# Patient Record
Sex: MALE
[De-identification: names, ages, dates, MRNs, and addresses within clinical notes are randomized per-mention and may not be internally consistent; named-entity substitution may affect disease eponyms.]

---

## 2021-08-18 ENCOUNTER — HOSPITAL ENCOUNTER (INPATIENT)
Dept: HOSPITAL 60 - LB.ED | Age: 71
LOS: 3 days | Discharge: NURSING, REHAB OR LONG-TERM CARE | DRG: 564 | End: 2021-08-21
Attending: SURGERY | Admitting: SURGERY
Payer: MEDICARE

## 2021-08-18 DIAGNOSIS — F17.200: ICD-10-CM

## 2021-08-18 DIAGNOSIS — K74.60: ICD-10-CM

## 2021-08-18 DIAGNOSIS — Z20.822: ICD-10-CM

## 2021-08-18 DIAGNOSIS — J18.9: ICD-10-CM

## 2021-08-18 DIAGNOSIS — E86.0: ICD-10-CM

## 2021-08-18 DIAGNOSIS — K72.90: ICD-10-CM

## 2021-08-18 DIAGNOSIS — T40.601A: ICD-10-CM
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
## 2021-08-18 DIAGNOSIS — M25.551: ICD-10-CM

## 2021-08-18 DIAGNOSIS — K65.2: ICD-10-CM

## 2021-08-18 DIAGNOSIS — T79.6XXA: Primary | ICD-10-CM

## 2021-08-18 PROCEDURE — 36415 COLL VENOUS BLD VENIPUNCTURE: CPT

## 2021-08-18 PROCEDURE — 70450 CT HEAD/BRAIN W/O DYE: CPT

## 2021-08-18 PROCEDURE — 51702 INSERT TEMP BLADDER CATH: CPT

## 2021-08-18 PROCEDURE — 71045 X-RAY EXAM CHEST 1 VIEW: CPT

## 2021-08-18 PROCEDURE — 93005 ELECTROCARDIOGRAM TRACING: CPT

## 2021-08-18 PROCEDURE — 73502 X-RAY EXAM HIP UNI 2-3 VIEWS: CPT

## 2021-08-18 PROCEDURE — 84484 ASSAY OF TROPONIN QUANT: CPT

## 2021-08-18 PROCEDURE — 80053 COMPREHEN METABOLIC PANEL: CPT

## 2021-08-18 PROCEDURE — 82550 ASSAY OF CK (CPK): CPT

## 2021-08-18 PROCEDURE — U0002 COVID-19 LAB TEST NON-CDC: HCPCS

## 2021-08-18 PROCEDURE — G0378 HOSPITAL OBSERVATION PER HR: HCPCS

## 2021-08-18 PROCEDURE — A0429 BLS-EMERGENCY: HCPCS

## 2021-08-18 PROCEDURE — A0425 GROUND MILEAGE: HCPCS

## 2021-08-18 PROCEDURE — 82140 ASSAY OF AMMONIA: CPT

## 2021-08-18 PROCEDURE — 81001 URINALYSIS AUTO W/SCOPE: CPT

## 2021-08-18 PROCEDURE — 74177 CT ABD & PELVIS W/CONTRAST: CPT

## 2021-08-18 PROCEDURE — 80048 BASIC METABOLIC PNL TOTAL CA: CPT

## 2021-08-18 PROCEDURE — 99285 EMERGENCY DEPT VISIT HI MDM: CPT

## 2021-08-18 PROCEDURE — 71260 CT THORAX DX C+: CPT

## 2021-08-18 PROCEDURE — 87040 BLOOD CULTURE FOR BACTERIA: CPT

## 2021-08-18 PROCEDURE — 85027 COMPLETE CBC AUTOMATED: CPT

## 2021-08-18 PROCEDURE — 73700 CT LOWER EXTREMITY W/O DYE: CPT

## 2021-08-18 NOTE — EDM.PDOC
ED HPI GENERAL MEDICAL PROBLEM





- General


Chief Complaint: Lower Extremity Injury/Pain


Stated Complaint: fall


Time Seen by Provider: 08/18/21 19:51


Source of Information: Reports: Patient


History Limitations: Reports: No Limitations





- History of Present Illness


INITIAL COMMENTS - FREE TEXT/NARRATIVE: 





patient was brought to the ER by EMS - after he was found on the floor by his s

on.





EMS reports that he was alert and oriented, but sleepy, c/o pain every where. a 

Near empty bottle of oxycodone was next to him as well - the bottle label says 

90# tablets - dispensed 5 days ago - to [facility] 1 tab q8hrs - seems that majority 

was missing except 7-8 tablets.





His son is providing a h/o - reports that his dad has chronic pain issues - 

starting with a h/o liver resection due to liver cancer several years ago.





Also was recently d/cd from OSH after he was admitted for viral Gi infection. 





No reports of fever or chills. 





h/o ESLD with mild ascites. 











- Related Data


                                    Allergies











Allergy/AdvReac Type Severity Reaction Status Date / Time


 


No Known Allergies Allergy   Verified 08/18/21 21:43














Review of Systems





- Review of Systems


Review Of Systems: See Below


Eyes: Reports: No Symptoms


Mouth/Throat: Reports: No Symptoms


Respiratory: Reports: No Symptoms


Cardiovascular: Reports: No Symptoms


GI/Abdominal: Reports: Abdominal Pain


Musculoskeletal: Reports: No Symptoms


Skin: Reports: Bruising, Pruritis





ED EXAM, GENERAL





- Physical Exam


Exam: See Below


Exam Limited By: No Limitations


General Appearance: Alert, WD/WN, No Apparent Distress


Eye Exam: Bilateral Eye: EOMI, PERRL


Head: Atraumatic


Respiratory/Chest: No Respiratory Distress


Cardiovascular: Normal Peripheral Pulses, Regular Rate, Rhythm


GI/Abdominal: Soft, Tender (chronic abdominal pain), Other (mildly distended )


Extremities: Other (there is a skin abrasion loss to the RUE)





Course





- Orders/Labs/Meds


Orders: 


                               Active Orders 24 hr











 Category Date Time Status


 


 Head wo Cont [CT] Stat Exams  08/18/21 21:34 Ordered


 


 Hip Min 2V w Pelvis Bi [CR] Stat Exams  08/18/21 21:34 Ordered


 


 Hip wo Cont Rt [CT] Stat Exams  08/18/21 21:42 Taken


 


 Sodium Chloride 0.9% [Normal Saline] 1,000 ml Med  08/18/21 21:45 Active





 IV ASDIRECTED   


 


 Sodium Chloride 0.9% [Saline Flush] Med  08/18/21 21:34 Active





 10 ml FLUSH ASDIRECTED PRN   


 


 Saline Lock Insert [OM.PC] Routine Oth  08/18/21 21:34 Ordered








                                Medication Orders





Sodium Chloride (Normal Saline)  1,000 mls @ 999 mls/hr IV ASDIRECTED SHIVA


Sodium Chloride (Sodium Chloride 0.9% 10 Ml Syringe)  10 ml FLUSH ASDIRECTED PRN


   PRN Reason: Keep Vein Open








Labs: 


                                Laboratory Tests











  08/18/21 08/18/21 08/18/21 Range/Units





  20:30 21:00 21:00 


 


WBC   14.9 H   (4.0-11.0)  K/uL


 


RBC   5.39   (4.50-6.50)  M/uL


 


Hgb   14.1   (13.0-18.0)  g/dL


 


Hct   42.3   (40.0-54.0)  %


 


MCV   79   (76-96)  fL


 


MCH   26.2 L   (27.0-32.0)  pg


 


MCHC   33.3   (31.0-35.0)  g/dL


 


RDW   17.3 H   (11.0-16.0)  %


 


Plt Count   112 L   (150-400)  K/uL


 


MPV   10.4 H   (6.0-10.0)  fL


 


Sodium    132 L  (136-145)  mmol/L


 


Potassium    4.6  (3.5-5.1)  mmol/L


 


Chloride    100  ()  mmol/L


 


Carbon Dioxide    27.9  (21.0-32.0)  mmol/L


 


Anion Gap    8.7  (5.0-15.0)  mmol/L


 


BUN    11  (8-26)  mg/dL


 


Creatinine    0.90  (0.70-1.30)  mg/dL


 


Est Cr Clr Drug Dosing    TNP  


 


Estimated GFR (MDRD)    > 60  (>60)  MLS/MIN


 


BUN/Creatinine Ratio    12.2  (6-25)  


 


Glucose    156 H  ()  mg/dL


 


Calcium    8.7  (8.5-10.1)  mg/dL


 


Total Bilirubin    1.5 H  (0.0-1.0)  mg/dL


 


AST    147 H  (15-37)  U/L


 


ALT    54  (12-78)  U/L


 


Alkaline Phosphatase    117 H  ()  U/L


 


Ammonia  44 H    (11-32)  umol/L


 


Creatine Kinase    3815 H  ()  U/L


 


Troponin I    < 0.017  (0.000-0.060)  ng/mL


 


Total Protein    7.4  (6.4-8.2)  g/dL


 


Albumin    2.8 L  (3.4-5.0)  g/dL


 


Globulin    4.6 H  (2.2-4.2)  g/dL


 


Albumin/Globulin Ratio    0.6 L  (0.8-2.0)  











Meds: 


Medications











Generic Name Dose Route Start Last Admin





  Trade Name Freq  PRN Reason Stop Dose Admin


 


Sodium Chloride  1,000 mls @ 999 mls/hr  08/18/21 21:45 





  Normal Saline  IV  





  ASDIRECTED SHIVA  


 


Sodium Chloride  10 ml  08/18/21 21:34 





  Sodium Chloride 0.9% 10 Ml Syringe  FLUSH  





  ASDIRECTED PRN  





  Keep Vein Open  














- Re-Assessments/Exams


Free Text/Narrative Re-Assessment/Exam: 





CT head - WNL





CT hip - no e/o fracture





IVF was started





labs - mild leukocytosis, and mild chronic elevation of liver enzymes





CK is elevated. as well as ammonia 





wound care with bacitracin and sterile dressing





patient probably overdoses on oxycodone and fell down due to that.








will admit to the floor for IVF, monitoring kidney function and CK levels. 








Departure





- Departure


Time of Disposition: 22:52


Disposition: Refer to Observation


Condition: Fair


Clinical Impression: 


 Right hip pain, Chronic liver disease and cirrhosis





Fall


Qualifiers:


 Encounter type: initial encounter Qualified Code(s): W19.XXXA - Unspecified 

fall, initial encounter





Traumatic rhabdomyolysis


Qualifiers:


 Encounter type: initial encounter Qualified Code(s): T79.6XXA - Traumatic 

ischemia of muscle, initial encounter





Narcotic overdose


Qualifiers:


 Encounter type: initial encounter Injury intent: accidental or unintentional 

Qualified Code(s): T40.601A - Poisoning by unspecified narcotics, accidental 

(unintentional), initial encounter








- Discharge Information


*PRESCRIPTION DRUG MONITORING PROGRAM REVIEWED*: Not Applicable


*COPY OF PRESCRIPTION DRUG MONITORING REPORT IN PATIENT BRIJESH: Not Applicable


Forms:  ED Department Discharge





- Problem List & Annotations


(1) Chronic liver disease and cirrhosis


SNOMED Code(s): 181509102


   Code(s): K74.60 - UNSPECIFIED CIRRHOSIS OF LIVER; K76.9 - LIVER DISEASE, 

UNSPECIFIED   Status: Chronic   Priority: Low   





(2) Fall


SNOMED Code(s): 1576053, 488680263


   Code(s): W19.XXXA - UNSPECIFIED FALL, INITIAL ENCOUNTER   Status: Acute   

Priority: Low   


Qualifiers: 


   Encounter type: initial encounter   Qualified Code(s): W19.XXXA - Unspecified

 fall, initial encounter   





(3) Narcotic overdose


SNOMED Code(s): 992619275


   Code(s): T40.601A - POISONING BY UNSP NARCOTICS, ACCIDENTAL, INIT   Status: 

Acute   Priority: Low   


Qualifiers: 


   Encounter type: initial encounter   Injury intent: accidental or 

unintentional   Qualified Code(s): T40.601A - Poisoning by unspecified 

narcotics, accidental (unintentional), initial encounter   





(4) Right hip pain


SNOMED Code(s): 04427839


   Code(s): M25.551 - PAIN IN RIGHT HIP   Status: Acute   Priority: Low   





(5) Traumatic rhabdomyolysis


SNOMED Code(s): 329651656


   Code(s): T79.6XXA - TRAUMATIC ISCHEMIA OF MUSCLE, INITIAL ENCOUNTER   Status:

 Acute   Priority: Medium   


Qualifiers: 


   Encounter type: initial encounter   Qualified Code(s): T79.6XXA - Traumatic 

ischemia of muscle, initial encounter   





- Problem List Review


Problem List Initiated/Reviewed/Updated: Yes





- My Orders


Last 24 Hours: 


My Active Orders





08/18/21 21:34


Head wo Cont [CT] Stat 


Hip Min 2V w Pelvis Bi [CR] Stat 


Sodium Chloride 0.9% [Saline Flush]   10 ml FLUSH ASDIRECTED PRN 


Saline Lock Insert [OM.PC] Routine 





08/18/21 21:42


Hip wo Cont Rt [CT] Stat 





08/18/21 21:45


Sodium Chloride 0.9% [Normal Saline] 1,000 ml IV ASDIRECTED 














- Assessment/Plan


Last 24 Hours: 


My Active Orders





08/18/21 21:34


Head wo Cont [CT] Stat 


Hip Min 2V w Pelvis Bi [CR] Stat 


Sodium Chloride 0.9% [Saline Flush]   10 ml FLUSH ASDIRECTED PRN 


Saline Lock Insert [OM.PC] Routine 





08/18/21 21:42


Hip wo Cont Rt [CT] Stat 





08/18/21 21:45


Sodium Chloride 0.9% [Normal Saline] 1,000 ml IV ASDIRECTED 











Plan: 





1- Rhabdomyolysis:


IVF - gently hydration


Repeat CK tomorrow 








2- Narcotics overdose:


avoid narcotics at the moment


Pain control with non-opioids 








3- ESLD:


2/2 liver cancer in the past











4- right hip pain: 


2/2 fall


no fracture on CT scan 











5- Dehydration:


IVF as needed











6- LUE wound:


bacitracin and sterile dressing

## 2021-08-19 RX ADMIN — KETOROLAC TROMETHAMINE SCH MG: 60 INJECTION, SOLUTION INTRAMUSCULAR at 05:02

## 2021-08-19 RX ADMIN — KETOROLAC TROMETHAMINE SCH: 60 INJECTION, SOLUTION INTRAMUSCULAR at 07:42

## 2021-08-19 RX ADMIN — KETOROLAC TROMETHAMINE SCH MG: 60 INJECTION, SOLUTION INTRAMUSCULAR at 10:16

## 2021-08-19 RX ADMIN — KETOROLAC TROMETHAMINE SCH MG: 60 INJECTION, SOLUTION INTRAMUSCULAR at 17:00

## 2021-08-19 RX ADMIN — KETOROLAC TROMETHAMINE SCH MG: 60 INJECTION, SOLUTION INTRAMUSCULAR at 23:08

## 2021-08-19 NOTE — PCM.HP.2
H&P History of Present Illness





- General


Date of Service: 08/19/21


Admit Problem/Dx: 


                           Admission Diagnosis/Problem





Admission Diagnosis/Problem      Rhabdomyolysis








Source of Information: Patient


History Limitations: Reports: No Limitations





- History of Present Illness


Initial Comments - Free Text/Narative: 





patient with a h/o ESLD who was admitted to the hospital for management of r

habdomyolysis.





He was found on the ground yesterday by his son, after he fell few hours 

earlier.





Patient has a h/o chronic pains - on oxycodone. There is a possibility that 

patient also has took extra doses given that his bottle was near empty.





CR was elevated with normal Cr.





IVF was started.





CT head WNL





c/o right hip pain - CT hip was obtained - this was negative as well.





ZENOBIA has a wound - this was cleaned up and dressed with sterile gauze.   





- Related Data


Allergies/Adverse Reactions: 


                                    Allergies











Allergy/AdvReac Type Severity Reaction Status Date / Time


 


No Known Allergies Allergy   Verified 08/18/21 21:43














Past Medical History





- Infectious Disease History


Infectious Disease History: Reports: Chicken Pox





Social & Family History





- Tobacco Use


Tobacco Use Status *Q: Light Tobacco User


Years of Tobacco use: 50


Packs/Tins Daily: 0.2





H&P Review of Systems





- Review of Systems:


Review Of Systems: See Below


General: Reports: Malaise, Fatigue


HEENT: Reports: No Symptoms


Pulmonary: Reports: No Symptoms


Cardiovascular: Reports: No Symptoms


Gastrointestinal: Reports: Abdominal Pain (chronic )


Genitourinary: Reports: No Symptoms


Musculoskeletal: Reports: Joint Pain, Muscle Pain


Skin: Reports: Bruising


Psychiatric: Reports: No Symptoms


Neurological: Reports: No Symptoms





Exam





- Exam


Exam: See Below





- Vital Signs


Vital Signs: 


                                Last Vital Signs











Temp  37.1 C   08/19/21 12:00


 


Pulse  85   08/19/21 12:00


 


Resp  18   08/19/21 08:00


 


BP  114/76   08/19/21 12:00


 


Pulse Ox  94 L  08/19/21 12:00














- Exam


General: Alert, Oriented


HEENT: PERRLA


Cardiovascular: Regular Rate, Regular Rhythm


GI/Abdominal Exam: Normal Bowel Sounds, Soft, Tender (mild TTP throughout )


Back Exam: Normal Inspection


Extremities: Normal Inspection, Normal Range of Motion


Neuro Extensive - Mental Status: Alert, Oriented x3, Normal Mood/Affect, Other 

(sleepy)





- Patient Data


Lab Results Last 24 hrs: 


                         Laboratory Results - last 24 hr











  08/18/21 08/18/21 08/18/21 Range/Units





  20:30 21:00 21:00 


 


WBC   14.9 H   (4.0-11.0)  K/uL


 


RBC   5.39   (4.50-6.50)  M/uL


 


Hgb   14.1   (13.0-18.0)  g/dL


 


Hct   42.3   (40.0-54.0)  %


 


MCV   79   (76-96)  fL


 


MCH   26.2 L   (27.0-32.0)  pg


 


MCHC   33.3   (31.0-35.0)  g/dL


 


RDW   17.3 H   (11.0-16.0)  %


 


Plt Count   112 L   (150-400)  K/uL


 


MPV   10.4 H   (6.0-10.0)  fL


 


Sodium    132 L  (136-145)  mmol/L


 


Potassium    4.6  (3.5-5.1)  mmol/L


 


Chloride    100  ()  mmol/L


 


Carbon Dioxide    27.9  (21.0-32.0)  mmol/L


 


Anion Gap    8.7  (5.0-15.0)  mmol/L


 


BUN    11  (8-26)  mg/dL


 


Creatinine    0.90  (0.70-1.30)  mg/dL


 


Est Cr Clr Drug Dosing    TNP  


 


Estimated GFR (MDRD)    > 60  (>60)  MLS/MIN


 


BUN/Creatinine Ratio    12.2  (6-25)  


 


Glucose    156 H  ()  mg/dL


 


Calcium    8.7  (8.5-10.1)  mg/dL


 


Total Bilirubin    1.5 H  (0.0-1.0)  mg/dL


 


AST    147 H  (15-37)  U/L


 


ALT    54  (12-78)  U/L


 


Alkaline Phosphatase    117 H  ()  U/L


 


Ammonia  44 H    (11-32)  umol/L


 


Creatine Kinase    3815 H  ()  U/L


 


Troponin I    < 0.017  (0.000-0.060)  ng/mL


 


Total Protein    7.4  (6.4-8.2)  g/dL


 


Albumin    2.8 L  (3.4-5.0)  g/dL


 


Globulin    4.6 H  (2.2-4.2)  g/dL


 


Albumin/Globulin Ratio    0.6 L  (0.8-2.0)  


 


SARS-CoV-2 RNA ([facility]OMA)     (NEGATIVE)  














  08/19/21 08/19/21 08/19/21 Range/Units





  09:02 10:43 10:43 


 


WBC   13.0 H   (4.0-11.0)  K/uL


 


RBC   5.02   (4.50-6.50)  M/uL


 


Hgb   13.1   (13.0-18.0)  g/dL


 


Hct   39.3 L   (40.0-54.0)  %


 


MCV   78   (76-96)  fL


 


MCH   26.1 L   (27.0-32.0)  pg


 


MCHC   33.3   (31.0-35.0)  g/dL


 


RDW   17.3 H   (11.0-16.0)  %


 


Plt Count   94 L   (150-400)  K/uL


 


MPV   10.3 H   (6.0-10.0)  fL


 


Sodium    134 L  (136-145)  mmol/L


 


Potassium    4.4  (3.5-5.1)  mmol/L


 


Chloride    103  ()  mmol/L


 


Carbon Dioxide    29.1  (21.0-32.0)  mmol/L


 


Anion Gap    6.3  (5.0-15.0)  mmol/L


 


BUN    9  (8-26)  mg/dL


 


Creatinine    0.77  (0.70-1.30)  mg/dL


 


Est Cr Clr Drug Dosing    TNP  


 


Estimated GFR (MDRD)    > 60  (>60)  MLS/MIN


 


BUN/Creatinine Ratio    11.7  (6-25)  


 


Glucose    121 H  ()  mg/dL


 


Calcium    8.3 L  (8.5-10.1)  mg/dL


 


Total Bilirubin     (0.0-1.0)  mg/dL


 


AST     (15-37)  U/L


 


ALT     (12-78)  U/L


 


Alkaline Phosphatase     ()  U/L


 


Ammonia     (11-32)  umol/L


 


Creatine Kinase     ()  U/L


 


Troponin I     (0.000-0.060)  ng/mL


 


Total Protein     (6.4-8.2)  g/dL


 


Albumin     (3.4-5.0)  g/dL


 


Globulin     (2.2-4.2)  g/dL


 


Albumin/Globulin Ratio     (0.8-2.0)  


 


SARS-CoV-2 RNA (CHIOMA)  Negative    (NEGATIVE)  














  08/19/21 08/19/21 Range/Units





  10:43 10:43 


 


WBC    (4.0-11.0)  K/uL


 


RBC    (4.50-6.50)  M/uL


 


Hgb    (13.0-18.0)  g/dL


 


Hct    (40.0-54.0)  %


 


MCV    (76-96)  fL


 


MCH    (27.0-32.0)  pg


 


MCHC    (31.0-35.0)  g/dL


 


RDW    (11.0-16.0)  %


 


Plt Count    (150-400)  K/uL


 


MPV    (6.0-10.0)  fL


 


Sodium    (136-145)  mmol/L


 


Potassium    (3.5-5.1)  mmol/L


 


Chloride    ()  mmol/L


 


Carbon Dioxide    (21.0-32.0)  mmol/L


 


Anion Gap    (5.0-15.0)  mmol/L


 


BUN    (8-26)  mg/dL


 


Creatinine    (0.70-1.30)  mg/dL


 


Est Cr Clr Drug Dosing    


 


Estimated GFR (MDRD)    (>60)  MLS/MIN


 


BUN/Creatinine Ratio    (6-25)  


 


Glucose    ()  mg/dL


 


Calcium    (8.5-10.1)  mg/dL


 


Total Bilirubin    (0.0-1.0)  mg/dL


 


AST    (15-37)  U/L


 


ALT    (12-78)  U/L


 


Alkaline Phosphatase    ()  U/L


 


Ammonia  29   (11-32)  umol/L


 


Creatine Kinase   1596 H  ()  U/L


 


Troponin I    (0.000-0.060)  ng/mL


 


Total Protein    (6.4-8.2)  g/dL


 


Albumin    (3.4-5.0)  g/dL


 


Globulin    (2.2-4.2)  g/dL


 


Albumin/Globulin Ratio    (0.8-2.0)  


 


SARS-CoV-2 RNA (CHIOMA)    (NEGATIVE)  











Result Diagrams: 


                                 08/19/21 10:43





                                 08/19/21 10:43





Sepsis Event Note





- Evaluation


Sepsis Screening Result: No Definite Risk





- Focused Exam


Vital Signs: 


                                   Vital Signs











  Temp Temp Pulse Resp BP Pulse Ox


 


 08/19/21 12:00   37.1 C  85   114/76  94 L


 


 08/19/21 08:00  36.8 C  36.8 C  88  18  119/78  96


 


 08/19/21 06:00  36.6 C   90  20  124/76  94 L














- Problem List


(1) Chronic liver disease and cirrhosis


SNOMED Code(s): 309798853


   ICD Code: K74.60 - UNSPECIFIED CIRRHOSIS OF LIVER; K76.9 - LIVER DISEASE, 

UNSPECIFIED   Status: Chronic   Priority: Low   Current Visit: No   





(2) Fall


SNOMED Code(s): 3463594, 785281381


   ICD Code: W19.XXXA - UNSPECIFIED FALL, INITIAL ENCOUNTER   Status: Acute   

Priority: Low   Current Visit: No   


Qualifiers: 


   Encounter type: initial encounter   Qualified Code(s): W19.XXXA - Unspecified

fall, initial encounter   





(3) Narcotic overdose


SNOMED Code(s): 788498568


   ICD Code: T40.601A - POISONING BY UNSP NARCOTICS, ACCIDENTAL, INIT   Status: 

Acute   Priority: Low   Current Visit: No   


Qualifiers: 


   Encounter type: initial encounter   Injury intent: accidental or unintent

ional   Qualified Code(s): T40.601A - Poisoning by unspecified narcotics, a

ccidental (unintentional), initial encounter   





(4) Right hip pain


SNOMED Code(s): 77869067


   ICD Code: M25.551 - PAIN IN RIGHT HIP   Status: Acute   Priority: Low   

Current Visit: No   





(5) Traumatic rhabdomyolysis


SNOMED Code(s): 723809700


   ICD Code: T79.6XXA - TRAUMATIC ISCHEMIA OF MUSCLE, INITIAL ENCOUNTER   

Status: Acute   Priority: Medium   Current Visit: No   


Qualifiers: 


   Encounter type: initial encounter   Qualified Code(s): T79.6XXA - Traumatic 

ischemia of muscle, initial encounter   


Problem List Initiated/Reviewed/Updated: Yes


Orders Last 24hrs: 


                               Active Orders 24 hr











 Category Date Time Status


 


 Patient Status [ADT] Routine ADT  08/18/21 22:57 Active


 


 Oxygen Therapy [RC] PRN Care  08/18/21 22:57 Active


 


 Vital Signs [RC] Q4H Care  08/18/21 22:57 Active


 


 Regular Diet [DIET] Diet  08/19/21 Breakfast Ordered


 


 CULTURE MRSA SURVEY [RM] Routine Lab  08/19/21 08:05 Received


 


 Ketorolac [Toradol] Med  08/18/21 23:00 Active





 15 mg IVPUSH Q6H   


 


 Sodium Chloride 0.9% [Normal Saline] 1,000 ml Med  08/18/21 21:45 Active





 IV ASDIRECTED   


 


 Sodium Chloride 0.9% [Normal Saline] 1,000 ml Med  08/19/21 08:30 Active





 IV ASDIRECTED   


 


 Sodium Chloride 0.9% [Saline Flush] Med  08/18/21 21:34 Active





 10 ml FLUSH ASDIRECTED PRN   


 


 Saline Lock Insert [OM.PC] Routine Oth  08/18/21 21:34 Ordered








                                Medication Orders





Sodium Chloride (Normal Saline)  1,000 mls @ 999 mls/hr IV ASDIRECTED SHIVA


   Last Infusion: 08/18/21 23:20  Dose: 0 mls/hr


   Documented by: RO


   Admin: 08/18/21 21:35  Dose: 999 mls/hr


   Documented by: RO


Sodium Chloride (Normal Saline)  1,000 mls @ 150 mls/hr IV ASDIRECTED SHIVA


   Last Admin: 08/19/21 08:37  Dose: 150 mls/hr


   Documented by: MATT


Ketorolac Tromethamine (Ketorolac 60 Mg/2 Ml Sdv)  15 mg IVPUSH Q6H [facility]


   Stop: 08/23/21 22:59


   Last Admin: 08/19/21 10:16  Dose: 15 mg


   Documented by: MATT


   Admin: 08/19/21 07:42 Dose:  Not Given


   Documented by: MATT


   Admin: 08/19/21 05:02  Dose: 15 mg


   Documented by: RO


Sodium Chloride (Sodium Chloride 0.9% 10 Ml Syringe)  10 ml FLUSH ASDIRECTED PRN


   PRN Reason: Keep Vein Open








Assessment/Plan Comment:: 





1- Rhabdomyolysis:   Improving 


IVF - gently hydration


Repeat CK daily 








2- Narcotics overdose:


avoid narcotics at the moment


Pain control with non-opioids 








3- ESLD:


2/2 liver cancer in the past











4- right hip pain:   Improving 


2/2 fall


no fracture on CT scan 











5- Dehydration:    Improving 


IVF @100ml/hr


watch for fluids overload 








6- LUE wound:


bacitracin and sterile dressing 








- Mortality Measure


Prognosis:: Good

## 2021-08-19 NOTE — CT
Date of Service:  08/18/21

Clinical Data:  fall/injury



RIGHT HIP CT:



Multislice acquisition through the right hip was performed.  Axial images and 
sagittal and coronal reformations are reviewed.  



There is diffuse osteopenia.  There are moderate osteoarthritic changes 
involving the right hip joint.  There are mild degenerative changes involving 
the right SI joint.  



No acute fracture or dislocation.  No focal lytic or blastic bone lesions.  



There is a large amount of free fluid within the peritoneal cavity consistent 
with ascites.  



[id number]

[facility]

## 2021-08-19 NOTE — CR
Date of Service:  08/18/21

Clinical Data:  fall



PELVIS AND RIGHT HIP:  



No priors.  



There is diffuse osteopenia.  



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  There are mild 
degenerative changes involving the SI joints.  



No acute abnormalities.  No lytic or blastic bone lesions.  



[id number]





      Dictated by:  Sergio Sanders MD      

          <Electronically signed by Sergio Sanders MD> 08/19/21 at 1039      
      



PN

D:   08/19/21, 0822

T:   08/19/21, 0837

Doc Number:  [id number]

Copies To:  Theodore Kinney MD; PCP,None~

MTDD

## 2021-08-19 NOTE — CT
Date of Service:  08/18/21

Clinical Data:  fall head injury



UNENHANCED BRAIN CT:  



Multislice acquisition through the brain without IV contrast was performed.  



No priors.  



No masses or mass effect.  No intracranial hemorrhage.  No evidence of acute or 
subacute infarct.  



No osseous abnormalities.  



IMPRESSION:  No acute intracranial abnormalities.  



[id number]





      Dictated by:  Sergio Sanders MD      

          <Electronically signed by Sergio Sanders MD> 08/19/21 at 1039      
      



PN

D:   08/19/21, 0822

T:   08/19/21, 0834

Doc Number:  [id number]

Copies To:  Theodore Kinney MD; PCP,None~

MTDD

## 2021-08-20 RX ADMIN — KETOROLAC TROMETHAMINE SCH MG: 60 INJECTION, SOLUTION INTRAMUSCULAR at 12:35

## 2021-08-20 RX ADMIN — KETOROLAC TROMETHAMINE SCH MG: 60 INJECTION, SOLUTION INTRAMUSCULAR at 05:07

## 2021-08-20 NOTE — PCM.PN
- General Info


Date of Service: 08/20/21


Admission Dx/Problem (Free Text): 


                           Admission Diagnosis/Problem





Admission Diagnosis/Problem      Rhabdomyolysis








Functional Status: Reports: Other (fatigued/ sleepy)





- Patient Data


Vitals - Most Recent: 


                                Last Vital Signs











Temp  37.0 C   08/20/21 10:00


 


Pulse  94   08/20/21 10:00


 


Resp  18   08/20/21 10:00


 


BP  95/64   08/20/21 10:00


 


Pulse Ox  93 L  08/20/21 10:00











Weight - Most Recent: 77.02 kg


I&O - Last 24 Hours: 


                                 Intake & Output











 08/19/21 08/20/21 08/20/21





 22:59 06:59 14:59


 


Intake Total 1444 1370 


 


Balance 1444 1370 











Lab Results Last 24 Hours: 


                         Laboratory Results - last 24 hr











  08/20/21 08/20/21 08/20/21 Range/Units





  04:45 05:00 05:00 


 


WBC   13.4 H   (4.0-11.0)  K/uL


 


RBC   4.91   (4.50-6.50)  M/uL


 


Hgb   12.9 L   (13.0-18.0)  g/dL


 


Hct   39.0 L   (40.0-54.0)  %


 


MCV   79   (76-96)  fL


 


MCH   26.3 L   (27.0-32.0)  pg


 


MCHC   33.1   (31.0-35.0)  g/dL


 


RDW   17.4 H   (11.0-16.0)  %


 


Plt Count   103 L   (150-400)  K/uL


 


MPV   11.3 H   (6.0-10.0)  fL


 


Sodium    135 L  (136-145)  mmol/L


 


Potassium    4.3  (3.5-5.1)  mmol/L


 


Chloride    105  ()  mmol/L


 


Carbon Dioxide    25.9  (21.0-32.0)  mmol/L


 


Anion Gap    8.4  (5.0-15.0)  mmol/L


 


BUN    14  D  (8-26)  mg/dL


 


Creatinine    0.90  (0.70-1.30)  mg/dL


 


Est Cr Clr Drug Dosing    TNP  


 


Estimated GFR (MDRD)    > 60  (>60)  MLS/MIN


 


BUN/Creatinine Ratio    15.6  (6-25)  


 


Glucose    116 H  ()  mg/dL


 


Calcium    8.2 L  (8.5-10.1)  mg/dL


 


Creatine Kinase     ()  U/L


 


Troponin I     (0.000-0.060)  ng/mL


 


Urine Color  Yellow    


 


Urine Appearance  Clear    (CLEAR)  


 


Urine pH  5.5    (5.0-8.0)  


 


Ur Specific Gravity  1.025    (1.003-1.030)  


 


Urine Protein  Negative    (NEGATIVE)  mg/dL


 


Urine Glucose (UA)  Negative    (NEGATIVE)  mg/dL


 


Urine Ketones  40 H    (NEGATIVE)  mg/dL


 


Urine Occult Blood  Trace-lysed H    (NEGATIVE)  


 


Urine Nitrite  Negative    (NEGATIVE)  


 


Urine Bilirubin  Small H    (NEGATIVE)  


 


Urine Urobilinogen  1.0    (0.2-1.0)  E.U./dL


 


Ur Leukocyte Esterase  Negative    (NEGATIVE)  


 


U Hyaline Cast (Auto)  Few    /HPF


 


Urine RBC  0-5 H    /HPF


 


Urine WBC  0-5 H    /HPF


 


Ur Squamous Epith Cells  Few    /HPF


 


Calcium Oxalate Crystal  Few    /HPF


 


Fine Granular Casts  Few H    /HPF














  08/20/21 08/20/21 Range/Units





  05:00 05:00 


 


WBC    (4.0-11.0)  K/uL


 


RBC    (4.50-6.50)  M/uL


 


Hgb    (13.0-18.0)  g/dL


 


Hct    (40.0-54.0)  %


 


MCV    (76-96)  fL


 


MCH    (27.0-32.0)  pg


 


MCHC    (31.0-35.0)  g/dL


 


RDW    (11.0-16.0)  %


 


Plt Count    (150-400)  K/uL


 


MPV    (6.0-10.0)  fL


 


Sodium    (136-145)  mmol/L


 


Potassium    (3.5-5.1)  mmol/L


 


Chloride    ()  mmol/L


 


Carbon Dioxide    (21.0-32.0)  mmol/L


 


Anion Gap    (5.0-15.0)  mmol/L


 


BUN    (8-26)  mg/dL


 


Creatinine    (0.70-1.30)  mg/dL


 


Est Cr Clr Drug Dosing    


 


Estimated GFR (MDRD)    (>60)  MLS/MIN


 


BUN/Creatinine Ratio    (6-25)  


 


Glucose    ()  mg/dL


 


Calcium    (8.5-10.1)  mg/dL


 


Creatine Kinase  1698 H   ()  U/L


 


Troponin I   < 0.017  (0.000-0.060)  ng/mL


 


Urine Color    


 


Urine Appearance    (CLEAR)  


 


Urine pH    (5.0-8.0)  


 


Ur Specific Gravity    (1.003-1.030)  


 


Urine Protein    (NEGATIVE)  mg/dL


 


Urine Glucose (UA)    (NEGATIVE)  mg/dL


 


Urine Ketones    (NEGATIVE)  mg/dL


 


Urine Occult Blood    (NEGATIVE)  


 


Urine Nitrite    (NEGATIVE)  


 


Urine Bilirubin    (NEGATIVE)  


 


Urine Urobilinogen    (0.2-1.0)  E.U./dL


 


Ur Leukocyte Esterase    (NEGATIVE)  


 


U Hyaline Cast (Auto)    /HPF


 


Urine RBC    /HPF


 


Urine WBC    /HPF


 


Ur Squamous Epith Cells    /HPF


 


Calcium Oxalate Crystal    /HPF


 


Fine Granular Casts    /HPF











Med Orders - Current: 


                               Current Medications





Sodium Chloride (Normal Saline)  1,000 mls @ 100 mls/hr IV ASDIRECTED [facility]


   Last Admin: 08/19/21 23:14 Dose:  100 mls/hr


   Documented by: 


Piperacillin Sod/Tazobactam (Sod 3.375 gm/ Sodium Chloride)  50 mls @ 100 mls/hr

IV Q6H [facility]


   Last Admin: 08/20/21 05:15 Dose:  100 mls/hr


   Documented by: 


Vancomycin HCl 1 gm/ Sodium (Chloride)  250 mls @ 167 mls/hr IV Q12H [facility]


Sodium Chloride (Normal Saline)  1,000 mls @ 999 mls/hr IV ASDIRECTED [facility]


Iopamidol (Iopamidol 612 Mg/Ml 100 Ml Bottle)  100 ml IV ASDIRECTED [facility]


   Last Admin: 08/20/21 10:21 Dose:  100 ml


   Documented by: 


Ketorolac Tromethamine (Ketorolac 60 Mg/2 Ml Sdv)  15 mg IVPUSH Q6H [facility]


   Stop: 08/23/21 22:59


   Last Admin: 08/20/21 05:07 Dose:  15 mg


   Documented by: 


Sodium Chloride (Sodium Chloride 0.9% 10 Ml Syringe)  10 ml FLUSH ASDIRECTED PRN


   PRN Reason: Keep Vein Open





Discontinued Medications





Acetaminophen (Acetaminophen 650 Mg Supp)  650 mg RECTAL NOW ONE


   Stop: 08/20/21 04:44


   Last Admin: 08/20/21 05:16 Dose:  650 mg


   Documented by: 


Sodium Chloride (Normal Saline)  1,000 mls @ 999 mls/hr IV ASDIRECTED [facility]


   Last Infusion: 08/18/21 23:20 Dose:  Infused


   Documented by: 


Vancomycin HCl 1 gm/ Sodium (Chloride)  250 mls @ 167 mls/hr IV Q24H [facility]


   Last Admin: 08/20/21 06:06 Dose:  167 mls/hr


   Documented by: 


Vancomycin HCl 1 gm/ Sodium (Chloride)  250 mls @ 167 mls/hr IV Q12H [facility]


Sodium Chloride (Sodium Chloride 0.9% 50 Ml Sdv)  50 ml FLUSH ONETIME ONE


   Stop: 08/20/21 09:31


   Last Admin: 08/20/21 10:21 Dose:  50 ml


   Documented by: 











- Exam


Urinary Catheter Total Time: 0Days  0Hours





- Patient Data


Lab Results Last 24 hrs: 


                         Laboratory Results - last 24 hr











  08/20/21 08/20/21 08/20/21 Range/Units





  04:45 05:00 05:00 


 


WBC   13.4 H   (4.0-11.0)  K/uL


 


RBC   4.91   (4.50-6.50)  M/uL


 


Hgb   12.9 L   (13.0-18.0)  g/dL


 


Hct   39.0 L   (40.0-54.0)  %


 


MCV   79   (76-96)  fL


 


MCH   26.3 L   (27.0-32.0)  pg


 


MCHC   33.1   (31.0-35.0)  g/dL


 


RDW   17.4 H   (11.0-16.0)  %


 


Plt Count   103 L   (150-400)  K/uL


 


MPV   11.3 H   (6.0-10.0)  fL


 


Sodium    135 L  (136-145)  mmol/L


 


Potassium    4.3  (3.5-5.1)  mmol/L


 


Chloride    105  ()  mmol/L


 


Carbon Dioxide    25.9  (21.0-32.0)  mmol/L


 


Anion Gap    8.4  (5.0-15.0)  mmol/L


 


BUN    14  D  (8-26)  mg/dL


 


Creatinine    0.90  (0.70-1.30)  mg/dL


 


Est Cr Clr Drug Dosing    TNP  


 


Estimated GFR (MDRD)    > 60  (>60)  MLS/MIN


 


BUN/Creatinine Ratio    15.6  (6-25)  


 


Glucose    116 H  ()  mg/dL


 


Calcium    8.2 L  (8.5-10.1)  mg/dL


 


Creatine Kinase     ()  U/L


 


Troponin I     (0.000-0.060)  ng/mL


 


Urine Color  Yellow    


 


Urine Appearance  Clear    (CLEAR)  


 


Urine pH  5.5    (5.0-8.0)  


 


Ur Specific Gravity  1.025    (1.003-1.030)  


 


Urine Protein  Negative    (NEGATIVE)  mg/dL


 


Urine Glucose (UA)  Negative    (NEGATIVE)  mg/dL


 


Urine Ketones  40 H    (NEGATIVE)  mg/dL


 


Urine Occult Blood  Trace-lysed H    (NEGATIVE)  


 


Urine Nitrite  Negative    (NEGATIVE)  


 


Urine Bilirubin  Small H    (NEGATIVE)  


 


Urine Urobilinogen  1.0    (0.2-1.0)  E.U./dL


 


Ur Leukocyte Esterase  Negative    (NEGATIVE)  


 


U Hyaline Cast (Auto)  Few    /HPF


 


Urine RBC  0-5 H    /HPF


 


Urine WBC  0-5 H    /HPF


 


Ur Squamous Epith Cells  Few    /HPF


 


Calcium Oxalate Crystal  Few    /HPF


 


Fine Granular Casts  Few H    /HPF














  08/20/21 08/20/21 Range/Units





  05:00 05:00 


 


WBC    (4.0-11.0)  K/uL


 


RBC    (4.50-6.50)  M/uL


 


Hgb    (13.0-18.0)  g/dL


 


Hct    (40.0-54.0)  %


 


MCV    (76-96)  fL


 


MCH    (27.0-32.0)  pg


 


MCHC    (31.0-35.0)  g/dL


 


RDW    (11.0-16.0)  %


 


Plt Count    (150-400)  K/uL


 


MPV    (6.0-10.0)  fL


 


Sodium    (136-145)  mmol/L


 


Potassium    (3.5-5.1)  mmol/L


 


Chloride    ()  mmol/L


 


Carbon Dioxide    (21.0-32.0)  mmol/L


 


Anion Gap    (5.0-15.0)  mmol/L


 


BUN    (8-26)  mg/dL


 


Creatinine    (0.70-1.30)  mg/dL


 


Est Cr Clr Drug Dosing    


 


Estimated GFR (MDRD)    (>60)  MLS/MIN


 


BUN/Creatinine Ratio    (6-25)  


 


Glucose    ()  mg/dL


 


Calcium    (8.5-10.1)  mg/dL


 


Creatine Kinase  1698 H   ()  U/L


 


Troponin I   < 0.017  (0.000-0.060)  ng/mL


 


Urine Color    


 


Urine Appearance    (CLEAR)  


 


Urine pH    (5.0-8.0)  


 


Ur Specific Gravity    (1.003-1.030)  


 


Urine Protein    (NEGATIVE)  mg/dL


 


Urine Glucose (UA)    (NEGATIVE)  mg/dL


 


Urine Ketones    (NEGATIVE)  mg/dL


 


Urine Occult Blood    (NEGATIVE)  


 


Urine Nitrite    (NEGATIVE)  


 


Urine Bilirubin    (NEGATIVE)  


 


Urine Urobilinogen    (0.2-1.0)  E.U./dL


 


Ur Leukocyte Esterase    (NEGATIVE)  


 


U Hyaline Cast (Auto)    /HPF


 


Urine RBC    /HPF


 


Urine WBC    /HPF


 


Ur Squamous Epith Cells    /HPF


 


Calcium Oxalate Crystal    /HPF


 


Fine Granular Casts    /HPF











Result Diagrams: 


                                 08/20/21 05:00





                                 08/20/21 05:00





Sepsis Event Note





- Evaluation


Sepsis Screening Result: Severe Sepsis Risk





- Focused Exam


Vital Signs: 


                                   Vital Signs











  Temp Temp Pulse Resp BP BP Pulse Ox


 


 08/20/21 10:00   37.0 C  94  18   95/64  93 L


 


 08/20/21 06:43   36.9 C  113 H    120/76  90 L


 


 08/20/21 06:16    114 H    89/51 L 


 


 08/20/21 06:09  38.6 C H   118 H  16  86/56 L   92 L


 


 08/20/21 04:00   39.3 C H  102 H   127/63  














- Problem List & Annotations


(1) Chronic liver disease and cirrhosis


SNOMED Code(s): 841363565


   Code(s): K74.60 - UNSPECIFIED CIRRHOSIS OF LIVER; K76.9 - LIVER DISEASE, 

UNSPECIFIED   Status: Chronic   Priority: Low   Current Visit: No   





(2) Fall


SNOMED Code(s): 3969049, 494762497


   Code(s): W19.XXXA - UNSPECIFIED FALL, INITIAL ENCOUNTER   Status: Acute   

Priority: Low   Current Visit: No   


Qualifiers: 


   Encounter type: initial encounter   Qualified Code(s): W19.XXXA - Unspecified

fall, initial encounter   





(3) Narcotic overdose


SNOMED Code(s): 214494755


   Code(s): T40.601A - POISONING BY UNSP NARCOTICS, ACCIDENTAL, INIT   Status: 

Acute   Priority: Low   Current Visit: No   


Qualifiers: 


   Encounter type: initial encounter   Injury intent: accidental or 

unintentional   Qualified Code(s): T40.601A - Poisoning by unspecified 

narcotics, accidental (unintentional), initial encounter   





(4) Right hip pain


SNOMED Code(s): 70254317


   Code(s): M25.551 - PAIN IN RIGHT HIP   Status: Acute   Priority: Low   

Current Visit: No   





(5) Traumatic rhabdomyolysis


SNOMED Code(s): 922904435


   Code(s): T79.6XXA - TRAUMATIC ISCHEMIA OF MUSCLE, INITIAL ENCOUNTER   Status:

Acute   Priority: Medium   Current Visit: No   


Qualifiers: 


   Encounter type: initial encounter   Qualified Code(s): T79.6XXA - Traumatic 

ischemia of muscle, initial encounter   





- My Orders


Last 24 Hours: 


My Active Orders





08/20/21 05:00


CULTURE BLOOD [BC] Stat 


Piperacillin/Tazobactam [Piperacil-Tazobact] 3.375 gm   Sodium Chloride 0.9% 

[Normal Saline] 50 ml IV Q6H 





08/20/21 06:26


Urinary Catheter Assessment [RC] 06,18 





08/20/21 06:30


Saldivar Catheter Insertion [Insert Urinary Catheter] [OM.PC] Q24H 





08/20/21 06:37


EKG 12 Lead [EK] Routine 





08/20/21 06:55


Renew/Continue Urinary Catheter [OM.PC] Routine 





08/20/21 09:29


Patient Status [ADT] Routine 





08/20/21 09:30


Iopamidol [Isovue-300 (61%)]   100 ml IV ASDIRECTED 





08/20/21 12:24


LACTIC ACID SEPSIS W/ REFLEX [LACTATE SEPSIS W/ REFLEX] [CHEM] Stat 





08/20/21 12:30


Sodium Chloride 0.9% [Normal Saline] 1,000 ml IV ASDIRECTED 





08/20/21 16:00


Vancomycin [Vancocin] 1 gm   Sodium Chloride 0.9% [Normal Saline (AdvBag)] 250 

ml IV Q12H 














- Plan


Plan:: 





1- Rhabdomyolysis:   Improving 


IVF - gently hydration


Repeat CK daily 








2- Narcotics overdose:


avoid narcotics at the moment


Pain control with non-opioids 








3- ESLD:


2/2 liver cancer in the past











4- right hip pain:   Improving 


2/2 fall


no fracture on CT scan 











5- Dehydration:    Improving 


IVF @100ml/hr


watch for fluids overload 








6- LUE wound:


bacitracin and sterile dressing

## 2021-08-20 NOTE — CR
DATE OF SERVICE: 08/20/2021



CLINICAL DATA: FEVER



Portable chest:



No priors.



The patient has taken a very poor inspiration.



The heart size is normal.



Move there are densities in both lung base consistent with basilar atelectasis 
or infiltrate. Pneumonia should be considered.



There is a rounded masslike density in the left lung base. Chest CT is 
recommended to further evaluate this.



There is blunting of both costophrenic angles consistent with bilateral pleural 
effusions.



No other significant findings. No pneumothorax.

MTDD

## 2021-08-20 NOTE — CT
DATE OF SERVICE: 08/20/2021



CLINICAL DATA: ABDOMEN PAIN





Enhanced chest CT:



Multi slice acquisition through the chest with IV contrast was performed.



No priors.



End there is a 3.7 cm rounded mass within the lingular segment of the left upper
lobe. This may represent focal consolidation secondary to pneumonia. I cannot 
exclude a pulmonary malignancy.



There are atelectatic changes in the dependent portion of both lungs and left 
lower lobe with areas of consolidation bilaterally. Pneumonia is suspected.



There are small bilateral pleural effusions.



No pneumothorax.



The heart size is normal. There are mild coronary artery calcifications. There 
are calcification in the region of the aortic and mitral valves. No pericardial 
effusion.



There is a calcified lymph node in the left hilum consistent with prior 
granulomatous disease. No enlarged hilar nodes.



There is a 2.8 cm rounded soft tissue density mass located just to the right of 
the distal esophagus at the level of the esophageal hiatus. Tumor metastatic 
disease should be considered.



There is degenerative disc disease throughout the thoracic spine. No lytic or 
blastic bone lesions.



No other significant findings.



Enhanced abdomen and pelvic CT:



Multi slice acquisition through the abdomen and pelvis with IV, but without oral
contrast was performed.



The liver is normal size. It does have a lobulated contour suggesting cirrhosis.
No focal hepatic lesions.



The spleen is enlarged. It measures 16.2 cm in length. There is prominence of 
the splenic vein and there are perisplenic collaterals. Portal hypertension is 
suspected.



The pancreas is unremarkable. There is soft tissue fullness of the right adrenal
glands suggesting adrenal hyperplasia. There is a low-density lesion in the 
right adrenal which is most likely benign.



The left adrenal appears normal.



The right and left kidneys enhance symmetrically. No hydronephrosis or 
hydroureter.



There is a Saldivar catheter within the bladder. It is decompressed.



There is a moderate amount of free fluid noted within the peritoneal cavity 
consistent with ascites.



There is a large amount of stool noted within the transverse and sigmoid colon.



There is mural thickening throughout the ascending and transverse colon. . 
Colitis should be considered. Also consider ischemic colitis. No pneumatosis.

No free air.



There is extensive fat stranding throughout the mesentery and omentum most 
likely secondary to the ascites. Infectious or inflammatory process cannot be 
excluded.



There is degenerative disc disease throughout the thoracic spine. No other 
significant findings.

MTDD

## 2021-08-21 PROCEDURE — 3E033XZ INTRODUCTION OF VASOPRESSOR INTO PERIPHERAL VEIN, PERCUTANEOUS APPROACH: ICD-10-PCS | Performed by: SURGERY

## 2021-08-22 NOTE — PCM.DCSUM1
**Discharge Summary





- Hospital Course


Brief History: patient with a h/o ESLD who was admitted to the hospital for 

management of rhabdomyolysis.  He was found on the ground by his son,unknown how

long he spent on the floor.  Patient has a h/o chronic pains - on oxycodone. 

There is a possibility that patient also has took extra doses given that his 

bottle was near empty.  CR was elevated with normal Cr.  IVF was started.  CT 

head WNL.  c/o right hip pain - CT hip was obtained - this was negative as well.

 ZENOBIA has a wound - this was cleaned up and dressed with sterile gauze.





- Discharge Data


Discharge Date: 08/20/21


Discharge Disposition: DC/Tfer to Acute Hospital 02


Condition: Fair





- Referral to Home Health


Primary Care Physician: 


PCP None








- Discharge Diagnosis/Problem(s)


(1) Chronic liver disease and cirrhosis


SNOMED Code(s): 886858355


   ICD Code: K74.60 - UNSPECIFIED CIRRHOSIS OF LIVER; K76.9 - LIVER DISEASE, 

UNSPECIFIED   Status: Chronic   Priority: Low   





(2) Fall


SNOMED Code(s): 3769975, 111791879


   ICD Code: W19.XXXA - UNSPECIFIED FALL, INITIAL ENCOUNTER   Status: Acute   

Priority: Low   


Qualifiers: 


   Encounter type: initial encounter   Qualified Code(s): W19.XXXA - Unspecified

fall, initial encounter   





(3) Narcotic overdose


SNOMED Code(s): 917615683


   ICD Code: T40.601A - POISONING BY UNSP NARCOTICS, ACCIDENTAL, INIT   Status: 

Acute   Priority: Low   


Qualifiers: 


   Encounter type: initial encounter   Injury intent: accidental or 

unintentional   Qualified Code(s): T40.601A - Poisoning by unspecified 

narcotics, accidental (unintentional), initial encounter   





(4) Right hip pain


SNOMED Code(s): 23799221


   ICD Code: M25.551 - PAIN IN RIGHT HIP   Status: Acute   Priority: Low   





(5) Traumatic rhabdomyolysis


SNOMED Code(s): 038242199


   ICD Code: T79.6XXA - TRAUMATIC ISCHEMIA OF MUSCLE, INITIAL ENCOUNTER   

Status: Acute   Priority: Medium   


Qualifiers: 


   Encounter type: initial encounter   Qualified Code(s): T79.6XXA - Traumatic 

ischemia of muscle, initial encounter   





- Patient Summary/Data


Hospital Course: 





while in the  floor - IVF was started and daily labs.





BP was noted to be trending down - and started to develop fever.





CT chest and abdomen were obtained - showed pneumonia - also concerns for 

enteritis ? SBP.





BLOOD Cx and IV abx were started ( IV vanco, zosyn and Rocephin for possible 

spontaneous bacterial peritonitis - given the ascites ).





BP was noted to be trending down to 75/50 even after 3 lit IVF - minimal UOP - 

stanford was inserted - only 300ml out in 24 hrs.








given the above changes - decision to transfer to a higher level of care - ICU 

bed .





IV levophed was started in the floor via a peripheral line - 10mcg/kg/hr -





BP up to 110/85 - UOP improved. 





discussed the case with the nearby facilities - called [facility] - patient was 

declined transfer due to critical nature - also called [facility] - no bed 

availability. 





Patient was finally accepted to [facility] for a transfer to ICU bed. 

Transfer by air was arranged - given the complexity and critical nature of 

illness. Need central line and A-line. 





critical time 50 min





total time with patient 120 min 





- Discharge Plan


*PRESCRIPTION DRUG MONITORING PROGRAM REVIEWED*: Not Applicable


*COPY OF PRESCRIPTION DRUG MONITORING REPORT IN PATIENT BRIJESH: Not Applicable


Forms:  ED Department Discharge


Referrals: 


PCP,None [Primary Care Provider] - 





- Discharge Summary/Plan Comment


DC Time >30 min.: Yes


Total # of Minutes for Discharge Time: 





120 min





critical time 50 min 





- Patient Data


Vitals - Most Recent: 


                                Last Vital Signs











Temp  97.8 C H  08/20/21 23:00


 


Pulse  62   08/20/21 23:00


 


Resp  18   08/20/21 22:00


 


BP  116/68   08/20/21 23:00


 


Pulse Ox  97   08/20/21 23:00











Weight - Most Recent: 77.02 kg


SHELL Results - Last 24 hrs: 


                                  Microbiology











 08/20/21 05:00 Aerobic Blood Culture - Preliminary





 Blood    NO GROWTH AFTER 2 DAYS





 Anaerobic Blood Culture - Preliminary





    NO GROWTH AFTER 2 DAYS











Med Orders - Current: 


                               Current Medications








Discontinued Medications





Acetaminophen (Acetaminophen 650 Mg Supp)  650 mg RECTAL NOW ONE


   Stop: 08/20/21 04:44


   Last Admin: 08/20/21 05:16 Dose:  650 mg


   Documented by: 


Calcium Gluconate (Calcium Gluconate 10% 1 Gm/10 Ml Sdv)  1 gm IVPUSH ONETIME 

ONE


   Stop: 08/20/21 19:19


   Last Admin: 08/20/21 19:30 Dose:  1 gm


   Documented by: 


Sodium Chloride (Normal Saline)  1,000 mls @ 999 mls/hr IV [facility]


   Last Infusion: 08/18/21 23:20 Dose:  Infused


   Documented by: 


Sodium Chloride (Normal Saline)  1,000 mls @ 100 mls/hr IV ASDIRECTED [facility]


   Last Infusion: 08/20/21 12:47 Dose:  Infused


   Documented by: 


Vancomycin HCl 1 gm/ Sodium (Chloride)  250 mls @ 167 mls/hr IV Q24H [facility]


   Last Admin: 08/20/21 06:06 Dose:  167 mls/hr


   Documented by: 


Piperacillin Sod/Tazobactam (Sod 3.375 gm/ Sodium Chloride)  50 mls @ 100 mls/hr

IV Q6H [facility]


   Last Admin: 08/20/21 23:07 Dose:  100 mls/hr


   Documented by: 


Vancomycin HCl 1 gm/ Sodium (Chloride)  250 mls @ 167 mls/hr IV Q12H [facility]


   Last Admin: 08/20/21 13:15 Dose:  Not Given


   Documented by: 


Vancomycin HCl 1 gm/ Sodium (Chloride)  250 mls @ 167 mls/hr IV Q12H [facility]


   Last Admin: 08/20/21 17:00 Dose:  167 mls/hr


   Documented by: 


Sodium Chloride (Normal Saline)  1,000 mls @ 999 mls/hr IV ASDIRECTED [facility]


   Last Admin: 08/20/21 21:14 Dose:  999 mls/hr


   Documented by: 


Ceftriaxone Sodium 2 gm/ (Sodium Chloride)  100 mls @ 200 mls/hr IV Q24H [facility]


   Last Admin: 08/20/21 16:42 Dose:  200 mls/hr


   Documented by: 


Lactated Ringer's (Ringers, Lactated)  1,000 mls @ 500 mls/hr IV ONETIME ONE


   Stop: 08/20/21 16:59


   Last Admin: 08/20/21 16:42 Dose:  500 mls/hr


   Documented by: 


Norepinephrine Bitartrate 4 mg (/ Dextrose/Water)  250 mls @ 7.5 mls/hr IV 

TITRATE [facility]; Protocol


   Last Titration: 08/20/21 21:14 Dose:  10 mcg/min, 37.5 mls/hr


   Documented by: 


Iopamidol (Iopamidol 612 Mg/Ml 100 Ml Bottle)  100 ml IV ASDIRECTED [facility]


   Last Admin: 08/20/21 10:21 Dose:  100 ml


   Documented by: 


Ketorolac Tromethamine (Ketorolac 60 Mg/2 Ml Sdv)  15 mg IVPUSH Q6H [facility]


   Stop: 08/23/21 22:59


   Last Admin: 08/20/21 12:35 Dose:  15 mg


   Documented by: 


Oxycodone HCl (Oxycodone 5 Mg Tab)  5 mg PO Q8H PRN


   PRN Reason: Abdominal Pain


   Last Admin: 08/20/21 23:26 Dose:  5 mg


   Documented by: 


Rivaroxaban (Rivaroxaban 10 Mg Tab)  10 mg PO WITHDINNER [facility]


   Last Admin: 08/20/21 17:53 Dose:  10 mg


   Documented by: 


Sodium Chloride (Sodium Chloride 0.9% 10 Ml Syringe)  10 ml FLUSH ASDIRECTED PRN


   PRN Reason: Keep Vein Open


Sodium Chloride (Sodium Chloride 0.9% 50 Ml Sdv)  50 ml FLUSH ONETIME ONE


   Stop: 08/20/21 09:31


   Last Admin: 08/20/21 10:21 Dose:  50 ml


   Documented by:

## 2022-10-02 ENCOUNTER — HOSPITAL ENCOUNTER (EMERGENCY)
Dept: HOSPITAL 60 - LB.ED | Age: 72
Discharge: HOME | End: 2022-10-02
Payer: MEDICARE

## 2022-10-02 DIAGNOSIS — S61.011A: Primary | ICD-10-CM

## 2022-10-02 DIAGNOSIS — Z23: ICD-10-CM
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
